# Patient Record
Sex: MALE | Race: OTHER | URBAN - METROPOLITAN AREA
[De-identification: names, ages, dates, MRNs, and addresses within clinical notes are randomized per-mention and may not be internally consistent; named-entity substitution may affect disease eponyms.]

---

## 2023-02-03 ENCOUNTER — HOSPITAL ENCOUNTER (OUTPATIENT)
Dept: LAB | Age: 28
Discharge: HOME | End: 2023-02-03

## 2023-02-03 ENCOUNTER — OFFICE VISIT (OUTPATIENT)
Dept: FAMILY MEDICINE CLINIC | Age: 28
End: 2023-02-03

## 2023-02-03 VITALS
OXYGEN SATURATION: 99 % | HEIGHT: 61 IN | BODY MASS INDEX: 26.43 KG/M2 | WEIGHT: 140 LBS | TEMPERATURE: 98 F | DIASTOLIC BLOOD PRESSURE: 84 MMHG | HEART RATE: 64 BPM | SYSTOLIC BLOOD PRESSURE: 130 MMHG

## 2023-02-03 DIAGNOSIS — R39.89 URINARY PROBLEM: Primary | ICD-10-CM

## 2023-02-03 DIAGNOSIS — R39.89 URINARY PROBLEM: ICD-10-CM

## 2023-02-03 DIAGNOSIS — L29.3 ITCHING OF MALE GENITALIA: ICD-10-CM

## 2023-02-03 DIAGNOSIS — F32.A DEPRESSION, UNSPECIFIED DEPRESSION TYPE: ICD-10-CM

## 2023-02-03 LAB
BILIRUB UR QL STRIP: NEGATIVE
GLUCOSE UR-MCNC: NEGATIVE MG/DL
KETONES P FAST UR STRIP-MCNC: NEGATIVE MG/DL
PH UR STRIP: 6 [PH] (ref 4.6–8)
PROT UR QL STRIP: NEGATIVE
SP GR UR STRIP: 1.01 (ref 1–1.03)
UA UROBILINOGEN AMB POC: NORMAL (ref 0.2–1)
URINALYSIS CLARITY POC: CLEAR
URINALYSIS COLOR POC: YELLOW
URINE BLOOD POC: NORMAL
URINE LEUKOCYTES POC: NEGATIVE
URINE NITRITES POC: NEGATIVE

## 2023-02-03 PROCEDURE — 36415 COLL VENOUS BLD VENIPUNCTURE: CPT

## 2023-02-03 PROCEDURE — 87491 CHLMYD TRACH DNA AMP PROBE: CPT

## 2023-02-03 RX ORDER — HYDROXYZINE 25 MG/1
25 TABLET, FILM COATED ORAL
Qty: 30 TABLET | Refills: 0 | Status: SHIPPED | OUTPATIENT
Start: 2023-02-03 | End: 2023-02-13

## 2023-02-03 NOTE — PROGRESS NOTES
Chief Complaint   Patient presents with    Other     SEE PHQ9 SCORE    Urinary Pain     URINARY PAIN AND ITCHINESS. MALODOR IN URINE.  STARTED ABOUT 8 MONTHS AGO (HAD NOT BEEN ABLE TO FIND A DOCTOR)     Visit Vitals  /84 (BP 1 Location: Left upper arm)   Pulse 64   Temp 98 °F (36.7 °C) (Temporal)   Ht 5' 1.02\" (1.55 m)   Wt 140 lb (63.5 kg)   SpO2 99%   BMI 26.43 kg/m²     Results for orders placed or performed in visit on 02/03/23   AMB POC URINALYSIS DIP STICK MANUAL W/O MICRO   Result Value Ref Range    Color (UA POC) Yellow     Clarity (UA POC) Clear     Glucose (UA POC) Negative Negative    Bilirubin (UA POC) Negative Negative    Ketones (UA POC) Negative Negative    Specific gravity (UA POC) 1.015 1.001 - 1.035    Blood (UA POC) Trace Negative    pH (UA POC) 6.0 4.6 - 8.0    Protein (UA POC) Negative Negative    Urobilinogen (UA POC) normal 0.2 - 1    Nitrites (UA POC) Negative Negative    Leukocyte esterase (UA POC) Negative Negative

## 2023-02-03 NOTE — PROGRESS NOTES
2/3/2023 : Sierra Lopez (: 1995) is a 32 y.o. male,  new patient, here for evaluation of the following chief complaint(s): Other (SEE PHQ9 SCORE) and Urinary Pain (URINARY PAIN AND ITCHINESS. MALODOR IN URINE. STARTED ABOUT 8 MONTHS AGO (HAD NOT BEEN ABLE TO FIND A DOCTOR))     ASSESSMENT/PLAN:  Below is the assessment and plan developed based on review of pertinent history, physical exam, labs, studies, and medications. 1. Urinary problem  -     AMB POC URINALYSIS DIP STICK MANUAL W/O MICRO  -     Anna Bunk / GC-AMPLIFIED; Future  2. Itching of male genitalia  -     hydrOXYzine HCL (ATARAX) 25 mg tablet; Take 1 Tablet by mouth three (3) times daily as needed for Itching for up to 10 days. , Normal, Disp-30 Tablet, R-0  3. Depression, unspecified depression type  Comments:  related to itchy genitalia. The sad thoughts keep him awake. But it's more the itching. No follow-ups on file. No blood in stool. SUBJECTIVE/OBJECTIVE:  HPI States the pain has been constant. Previous to these symptoms he had unprotected sex. Now a lot of itchiness. As he cleaned himself he has a white discharge where his anus is. At first saw a white fluid. Then the itchiness started. Pain is in mid back in the center. Always has back pain and can't sleep well. States fatigued. No change in weight. Urinnation - starts to feel pain in penis, then moves to lower abdomen. He also has anal itching. No creams helped.     Results for orders placed or performed in visit on 23   AMB POC URINALYSIS DIP STICK MANUAL W/O MICRO   Result Value Ref Range    Color (UA POC) Yellow     Clarity (UA POC) Clear     Glucose (UA POC) Negative Negative    Bilirubin (UA POC) Negative Negative    Ketones (UA POC) Negative Negative    Specific gravity (UA POC) 1.015 1.001 - 1.035    Blood (UA POC) Trace Negative    pH (UA POC) 6.0 4.6 - 8.0    Protein (UA POC) Negative Negative    Urobilinogen (UA POC) normal 0.2 - 1    Nitrites (UA POC) Negative Negative    Leukocyte esterase (UA POC) Negative Negative     Current Medications:   Current Outpatient Medications   Medication Sig    hydrOXYzine HCL (ATARAX) 25 mg tablet Take 1 Tablet by mouth three (3) times daily as needed for Itching for up to 10 days. Review of Systems: Negative for: fever, chest pain, shortness of breath, leg swelling. Social History:  reports that he has quit smoking. His smoking use included cigarettes. He has never used smokeless tobacco. He reports that he does not currently use alcohol. He reports that he does not currently use drugs. Physical Examination:  Blood pressure 130/84, pulse 64, temperature 98 °F (36.7 °C), temperature source Temporal, height 5' 1.02\" (1.55 m), weight 140 lb (63.5 kg), SpO2 99 %. General appearance - well developed, no acute distress. Chest - clear to auscultation. Heart - regular rate and rhythm without murmurs, rubs, or gallops. Abdomen - bowel sounds present x 4, NT, ND  Genitalia - uncircumsized without discharge. The skin of his scrotum is darker than surrounding skin with appearance looking like acanthosis nigricans. Extremities - no CCE. An electronic signature was used to authenticate this note.   -- Colton Hemphill NP

## 2023-02-03 NOTE — PROGRESS NOTES
Name and  confirmed w/ patient. An After Visit Summary was provided and all discharge instructions were reviewed with the patient including: hygiene tips and when to expect his results. Time for questions and answers provided, patient verbalized understanding. Patient discharged from clinic in stable condition. CVAN  Myranda Tubbs assisted with interpretation.

## 2023-02-07 LAB
C TRACH RRNA SPEC QL NAA+PROBE: NEGATIVE
N GONORRHOEA RRNA SPEC QL NAA+PROBE: NEGATIVE
SPECIMEN SOURCE: NORMAL

## 2023-08-09 PROCEDURE — 87491 CHLMYD TRACH DNA AMP PROBE: CPT

## 2023-08-09 PROCEDURE — 87591 N.GONORRHOEAE DNA AMP PROB: CPT

## 2023-08-10 ENCOUNTER — HOSPITAL ENCOUNTER (OUTPATIENT)
Facility: HOSPITAL | Age: 28
Setting detail: SPECIMEN
Discharge: HOME | End: 2023-08-13
